# Patient Record
Sex: MALE | URBAN - METROPOLITAN AREA
[De-identification: names, ages, dates, MRNs, and addresses within clinical notes are randomized per-mention and may not be internally consistent; named-entity substitution may affect disease eponyms.]

---

## 2023-02-05 ENCOUNTER — NURSE TRIAGE (OUTPATIENT)
Dept: NURSING | Facility: CLINIC | Age: 23
End: 2023-02-05

## 2023-02-06 NOTE — TELEPHONE ENCOUNTER
Nurse Triage SBAR    Situation: Penis question    Background: Patient calling. Pt lied about his age he is 16.     Assessment: Patient is wanting to increase the size of his penis and wants to know how he can achieve that.     Recommendation: According to the protocol, Patient should call his clinic tomorrow. Advised Patient to call his clinic tomorrow. Care advice given. Patient verbalizes understanding and agrees with plan of care.     Donna Wheeler RN Nursing Advisor 2/5/2023 9:36 PM     Reason for Disposition    [1] Caller requesting NON-URGENT health information AND [2] PCP's office is the best resource    Protocols used: INFORMATION ONLY CALL - NO TRIAGE-A-